# Patient Record
Sex: MALE | URBAN - METROPOLITAN AREA
[De-identification: names, ages, dates, MRNs, and addresses within clinical notes are randomized per-mention and may not be internally consistent; named-entity substitution may affect disease eponyms.]

---
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
## 2024-09-09 ENCOUNTER — ATHLETIC TRAINING (OUTPATIENT)
Dept: SPORTS MEDICINE | Facility: OTHER | Age: 19
End: 2024-09-09

## 2024-09-09 DIAGNOSIS — M79.671 RIGHT FOOT PAIN: Primary | ICD-10-CM

## 2024-09-09 NOTE — PROGRESS NOTES
Athletic Training Foot/Ankle Evaluation    Name: Se Early  Age: 19 y.o.   School District: [employer]   Sport: Men's Cross Country  Date of Assessment: 9/9/2024    Assessment/Plan:     Visit Diagnosis: Right foot pain [M79.671]    Treatment Plan: Strengthen foot extensor muscles.    [x]  Follow-up PRN.   []  Follow-up prior to next practice/game for re-evaluation.  []  Daily treatment/rehab. Progress note expected weekly.     Referral:     [x]  Not needed at this time  []  Referred to:     [x]  Coaching staff notified  []  Parent/Guardian Notified    Subjective:    Date of Injury: 8/30/24    Injury occurred during:     [x]  Practice  []  Competition  []  Other:     Mechanism: Overuse injury while running, noticed a sharp pain develop during one of his runs. The first meet really aggravated it and he has had it mostly during explosive sprints. He is able to walk and do long runs with out discomfort.    Previous History: Previous history of the same injury    Reported Symptoms:     [] Felt pop [] Weakness   [] Cracking or snapping [] Grinding   [] Twisted [x] Sharp pain   [] Pain with rest [] Burning   [x] Pain with activity [] Dull or achy   [] Pain with stairs [] Felt give way   [] Numbness or tingling [] Loss of motion     Objective:    Observation:     [x]  No observable findings compared bilaterally    [] Swelling [] Callous or blister   [] Ecchymosis [] Nail abnormality   [] Redness [] Ingrown nail   [] Deformity [] Bunion formation   [] Abnormal gait [] Pes planus   [] Pitting edema [] Pes cavus   [] Open wound [] Atrophy     Palpation: Extensor digitorum of fourth    Active Range of Motion:      Full  ROM Limited  ROM Pain  with  ROM No  Motion   Dorsiflexion [] [] [] []   Plantarflexion [] [] [] []   Inversion [] [] [] []   Eversion [] [] [] []   Great Toe Flexion [] [] [] []   Great Toe Extension [] [] [] []   Toe Flexion [] [] [] []   Toe Extension [] [x] [] []     Manual Muscle Tests:     Not performed  []             5 4+ 4 4- 3 or  Under   Dorsiflexion [] [] [] [] []   Plantarflexion [] [] [] [] []   Inversion [] [] [] [] []   Eversion [] [] [] [] []   Great Toe Flexion [] [] [] [] []   Great Toe Extension [] [] [] [] []   Toe Flexion [x] [] [] [] []   Toe Extension [x] [] [] [] []     Special Tests:      (+)  Laxity (+)  Pain (-)  WNL Not  Tested   Bump [] [] [x] []   Squeeze [] [] [x] []   Percussion [] [] [] []   Tuning Fork [] [] [] []   Anterior Drawer [] [] [] []   Posterior Drawer [] [] [] []   Talar Tilt - Inversion [] [] [] []   Talar Tilt - Eversion [] [] [] []   Kleiger [] [] [] []   Toe Compression [] [] [] []   Toe Distraction [] [] [] []   MTP Valgus [] [] [] []   MTP Varus [] [] [] []   Intermetatarsal Glide [] [] [] []   Tarsometatarsal Glide [] [] [] []   Tinel's [] [] [] []   Impingement Sign [] [] [] []   Bower's ([place]chilles) [] [] [] []   Madison's Sign (DVT) [] [] [] []   Interdigital Neuroma [] [] [] []   Navicular Drop [] [] [] []     Treatment Log:     Date: 9/9/24   Playing Status: Full       Exercise/Treatment    Toe Yoga 3x10   Pogo Jumps 3x1min   Ultra Sound 5min                                   [facility] ATS  [facility] LAT ATC

## 2024-09-21 ENCOUNTER — ATHLETIC TRAINING (OUTPATIENT)
Dept: SPORTS MEDICINE | Facility: OTHER | Age: 19
End: 2024-09-21

## 2024-09-21 DIAGNOSIS — M79.671 RIGHT FOOT PAIN: Primary | ICD-10-CM

## 2024-09-24 NOTE — PROGRESS NOTES
Athletic Training Progress Note    Name: Se Early  Age: 19 y.o.     Assessment/Plan:     Visit Diagnosis: Right foot pain [M79.671]    Treatment Plan: Manage Pain, Intrinsics    [x]  Follow-up PRN.   []  Follow-up prior to next practice/game for re-evaluation.  []  Daily treatment/rehab. Progress note expected weekly.     Subjective: Se reported to the [facility] today to cont treatment on his right foot. He reports cont. decreased discomfort since last visit. He reported he has no discomfort while walking and mild discomfort while completing more explosive sprint type workouts. He reports his last race was much better than the first.    Objective:   See treatment log below    Treatment Log:     Date: 9/21/24 9/9/24   Playing Status: Full Full        Exercise/Treatment     Toe Yoga 3x10 3x10   Pogo Jumps  3x1min   Ultra Sound  5min   Toe Extensor Iso 3x30s B/L                                       Se will cont to schedule appt. PRN.  CY LAT ATC

## 2025-04-01 ENCOUNTER — ATHLETIC TRAINING (OUTPATIENT)
Dept: SPORTS MEDICINE | Facility: OTHER | Age: 20
End: 2025-04-01

## 2025-04-01 DIAGNOSIS — M76.32 ILIOTIBIAL BAND SYNDROME, LEFT: Primary | ICD-10-CM

## 2025-04-01 DIAGNOSIS — M62.89 MUSCLE TIGHTNESS: ICD-10-CM

## 2025-04-01 NOTE — PROGRESS NOTES
Athletic Training Initial Evaluation    Name: Se Early  Age: 20 y.o.   School District: [employer]  Sport: Men Outdoor Track & Field  Date of Assessment: 4/1/2025      Assessment/Plan:   Visit Diagnosis: Iliotibial band syndrome, left [M76.32]    Treatment Plan: Strengthening Program, Flexibility Program, and Taping/Bracing    Referral: No referral needed at this time    Anticipated date of next Re-Evaluation/Progress note: 4/3/25    Subjective: Se reported to the [facility] today to have his left ITBand evaluated and treated. Se briefly stopped by on Thursday last week after practice to discuss his knee/Itband. He reported mild discomfort while running and wanted to see if he could be taped for his 10k race the next day on Friday at [place]. Today, he is following up and reports he had minimal to no discomfort during the race due to adrenaline and/or the [place]. He states the following day from the race was rough and he was quite sore. He reports clicking/popping while flexing and extending his knee. He has a easy run today.      Objective:   Obers (+)  TTP over distal ITBand        Treatment Log       Date: 4/1/25   Playing Status Full Play - No Restrictions   Tolerated Treatment Well       Exercise/Treatment    Bike Warmup 5min   Cupping 8min                                   Se will f/u with AT staff in 2 days to cont treatment on his left ITBand.  CY LAT ATC

## 2025-04-04 ENCOUNTER — ATHLETIC TRAINING (OUTPATIENT)
Dept: SPORTS MEDICINE | Facility: OTHER | Age: 20
End: 2025-04-04

## 2025-04-04 DIAGNOSIS — M62.89 MUSCLE TIGHTNESS: ICD-10-CM

## 2025-04-04 DIAGNOSIS — M76.32 ILIOTIBIAL BAND SYNDROME, LEFT: Primary | ICD-10-CM

## 2025-04-04 NOTE — PROGRESS NOTES
Athletic Training Progress Note    Name: Se Early  Age: 20 y.o.     Assessment/Plan:     Visit Diagnosis: Iliotibial band syndrome, left [M76.32]    Treatment Plan: Lower Extremity Mobility    []  Follow-up PRN.   []  Follow-up prior to next practice/game for re-evaluation.  [x]  Daily treatment/rehab. Progress note expected weekly.     Subjective: Se reported to the [facility] today to cont treatment on his left IT Band. He reports the cupping he received the last time he was in helped but the day after the discomfort moved distally. He is not racing the 10k again until his meet at [place].    Objective:   See treatment log below    Treatment Log:         Date: 4/4/25 4/1/25   Playing Status Full Full Play - No Restrictions   Tolerated Treatment Well Well        Exercise/Treatment     Bike Warmup 7min 5min   Cupping  8min   IASTM 8min                                       Se will f/u with AT staff in 2 days to cont treatment on his left ITBand.  CY LAT ATC